# Patient Record
Sex: FEMALE | Race: WHITE
[De-identification: names, ages, dates, MRNs, and addresses within clinical notes are randomized per-mention and may not be internally consistent; named-entity substitution may affect disease eponyms.]

---

## 2017-02-24 ENCOUNTER — HOSPITAL ENCOUNTER (OUTPATIENT)
Dept: HOSPITAL 19 - COL.RAD | Age: 77
End: 2017-02-24
Attending: INTERNAL MEDICINE
Payer: MEDICARE

## 2017-02-24 DIAGNOSIS — I71.2: Primary | ICD-10-CM

## 2017-02-24 DIAGNOSIS — M47.896: ICD-10-CM

## 2017-09-29 ENCOUNTER — HOSPITAL ENCOUNTER (OUTPATIENT)
Dept: HOSPITAL 19 - COL.CAR | Age: 77
Discharge: HOME | End: 2017-09-29
Attending: INTERNAL MEDICINE
Payer: MEDICARE

## 2017-09-29 VITALS — DIASTOLIC BLOOD PRESSURE: 58 MMHG | SYSTOLIC BLOOD PRESSURE: 119 MMHG | TEMPERATURE: 98 F | HEART RATE: 48 BPM

## 2017-09-29 VITALS — HEIGHT: 65.98 IN | WEIGHT: 175.27 LBS | BODY MASS INDEX: 28.17 KG/M2

## 2017-09-29 VITALS — DIASTOLIC BLOOD PRESSURE: 67 MMHG | SYSTOLIC BLOOD PRESSURE: 133 MMHG | HEART RATE: 59 BPM | TEMPERATURE: 98.1 F

## 2017-09-29 DIAGNOSIS — I08.3: ICD-10-CM

## 2017-09-29 DIAGNOSIS — L40.9: ICD-10-CM

## 2017-09-29 DIAGNOSIS — Z79.01: ICD-10-CM

## 2017-09-29 DIAGNOSIS — G72.9: ICD-10-CM

## 2017-09-29 DIAGNOSIS — R00.2: ICD-10-CM

## 2017-09-29 DIAGNOSIS — I27.2: ICD-10-CM

## 2017-09-29 DIAGNOSIS — I71.2: ICD-10-CM

## 2017-09-29 DIAGNOSIS — I10: ICD-10-CM

## 2017-09-29 DIAGNOSIS — M43.06: ICD-10-CM

## 2017-09-29 DIAGNOSIS — I48.0: Primary | ICD-10-CM

## 2017-09-29 DIAGNOSIS — E07.9: ICD-10-CM

## 2017-09-29 LAB
ERYTHROCYTE [DISTWIDTH] IN BLOOD BY AUTOMATED COUNT: 13.5 % (ref 11.5–14.5)
HCT VFR BLD AUTO: 39 % (ref 37–47)
HGB BLD-MCNC: 12.8 G/DL (ref 12.5–16)
MCH RBC QN AUTO: 30 PG (ref 27–31)
MCHC RBC AUTO-ENTMCNC: 33 G/DL (ref 33–37)
MCV RBC AUTO: 90 FL (ref 80–100)
PLATELET # BLD AUTO: 219 K/MM3 (ref 130–400)
PMV BLD AUTO: 10.4 FL (ref 7.4–10.4)
RBC # BLD AUTO: 4.33 M/MM3 (ref 4.1–5.3)
WBC # BLD AUTO: 4.8 K/MM3 (ref 4.8–10.8)

## 2017-12-11 ENCOUNTER — HOSPITAL ENCOUNTER (OUTPATIENT)
Dept: HOSPITAL 19 - MC.RAD | Age: 77
End: 2017-12-11
Attending: FAMILY MEDICINE
Payer: MEDICARE

## 2017-12-11 DIAGNOSIS — Z12.31: Primary | ICD-10-CM

## 2018-11-15 ENCOUNTER — HOSPITAL ENCOUNTER (OUTPATIENT)
Dept: HOSPITAL 6 - RAD | Age: 78
End: 2018-11-15
Payer: MEDICARE

## 2018-11-15 DIAGNOSIS — I71.4: Primary | ICD-10-CM

## 2019-04-05 ENCOUNTER — HOSPITAL ENCOUNTER (OUTPATIENT)
Dept: HOSPITAL 19 - MC.RAD | Age: 79
End: 2019-04-05
Attending: FAMILY MEDICINE
Payer: MEDICARE

## 2019-04-05 DIAGNOSIS — Z98.890: ICD-10-CM

## 2019-04-05 DIAGNOSIS — Z12.31: Primary | ICD-10-CM

## 2019-04-19 ENCOUNTER — HOSPITAL ENCOUNTER (OUTPATIENT)
Dept: HOSPITAL 19 - COL.RAD | Age: 79
End: 2019-04-19
Attending: NURSE PRACTITIONER
Payer: MEDICARE

## 2019-04-19 DIAGNOSIS — I74.5: ICD-10-CM

## 2019-04-19 DIAGNOSIS — I71.4: Primary | ICD-10-CM

## 2019-04-19 DIAGNOSIS — Z90.710: ICD-10-CM

## 2019-04-29 ENCOUNTER — HOSPITAL ENCOUNTER (OUTPATIENT)
Dept: HOSPITAL 19 - COL.RAD | Age: 79
End: 2019-04-29
Attending: NURSE PRACTITIONER
Payer: MEDICARE

## 2019-04-29 DIAGNOSIS — M12.88: ICD-10-CM

## 2019-04-29 DIAGNOSIS — M43.16: ICD-10-CM

## 2019-04-29 DIAGNOSIS — M48.07: Primary | ICD-10-CM

## 2019-04-29 DIAGNOSIS — M51.36: ICD-10-CM

## 2019-04-29 PROCEDURE — A9585 GADOBUTROL INJECTION: HCPCS

## 2019-09-10 ENCOUNTER — HOSPITAL ENCOUNTER (OUTPATIENT)
Dept: HOSPITAL 19 - MHCPAIN | Age: 79
End: 2019-09-10
Attending: ANESTHESIOLOGY
Payer: MEDICARE

## 2019-09-10 DIAGNOSIS — M54.16: ICD-10-CM

## 2019-09-10 DIAGNOSIS — G89.29: Primary | ICD-10-CM

## 2019-09-10 DIAGNOSIS — M96.1: ICD-10-CM

## 2019-09-10 DIAGNOSIS — M53.3: ICD-10-CM

## 2019-09-10 DIAGNOSIS — M47.817: ICD-10-CM

## 2019-09-10 PROCEDURE — G0463 HOSPITAL OUTPT CLINIC VISIT: HCPCS

## 2019-09-16 ENCOUNTER — HOSPITAL ENCOUNTER (OUTPATIENT)
Dept: HOSPITAL 19 - MHCPAIN | Age: 79
End: 2019-09-16
Attending: ANESTHESIOLOGY
Payer: MEDICARE

## 2019-09-16 DIAGNOSIS — M54.16: ICD-10-CM

## 2019-09-16 DIAGNOSIS — M47.817: Primary | ICD-10-CM

## 2019-10-01 ENCOUNTER — HOSPITAL ENCOUNTER (OUTPATIENT)
Dept: HOSPITAL 6 - RAD | Age: 79
End: 2019-10-01
Payer: MEDICARE

## 2019-10-01 DIAGNOSIS — I71.2: Primary | ICD-10-CM

## 2019-10-02 ENCOUNTER — HOSPITAL ENCOUNTER (OUTPATIENT)
Dept: HOSPITAL 19 - MHCPAIN | Age: 79
End: 2019-10-02
Attending: ANESTHESIOLOGY
Payer: MEDICARE

## 2019-10-02 DIAGNOSIS — M53.3: ICD-10-CM

## 2019-10-02 DIAGNOSIS — M54.16: ICD-10-CM

## 2019-10-02 DIAGNOSIS — G89.29: Primary | ICD-10-CM

## 2019-10-02 DIAGNOSIS — M96.1: ICD-10-CM

## 2019-10-02 DIAGNOSIS — M47.817: ICD-10-CM

## 2019-10-02 PROCEDURE — G0463 HOSPITAL OUTPT CLINIC VISIT: HCPCS

## 2019-10-07 ENCOUNTER — HOSPITAL ENCOUNTER (OUTPATIENT)
Dept: HOSPITAL 19 - MHCPAIN | Age: 79
End: 2019-10-07
Attending: ANESTHESIOLOGY
Payer: MEDICARE

## 2019-10-07 DIAGNOSIS — M47.817: Primary | ICD-10-CM

## 2019-10-07 DIAGNOSIS — M54.16: ICD-10-CM

## 2019-10-16 ENCOUNTER — HOSPITAL ENCOUNTER (OUTPATIENT)
Dept: HOSPITAL 19 - MHCPAIN | Age: 79
End: 2019-10-16
Attending: ANESTHESIOLOGY
Payer: MEDICARE

## 2019-10-16 DIAGNOSIS — G89.29: Primary | ICD-10-CM

## 2019-10-16 DIAGNOSIS — M54.16: ICD-10-CM

## 2019-10-16 DIAGNOSIS — M47.817: ICD-10-CM

## 2019-10-16 DIAGNOSIS — M53.3: ICD-10-CM

## 2019-10-16 PROCEDURE — G0463 HOSPITAL OUTPT CLINIC VISIT: HCPCS

## 2019-10-21 ENCOUNTER — HOSPITAL ENCOUNTER (OUTPATIENT)
Dept: HOSPITAL 19 - MHCPAIN | Age: 79
End: 2019-10-21
Attending: ANESTHESIOLOGY
Payer: MEDICARE

## 2019-10-21 DIAGNOSIS — M54.16: ICD-10-CM

## 2019-10-21 DIAGNOSIS — M47.817: Primary | ICD-10-CM

## 2019-11-06 ENCOUNTER — HOSPITAL ENCOUNTER (OUTPATIENT)
Dept: HOSPITAL 19 - MHCPAIN | Age: 79
End: 2019-11-06
Attending: ANESTHESIOLOGY
Payer: MEDICARE

## 2019-11-06 DIAGNOSIS — G89.29: Primary | ICD-10-CM

## 2019-11-06 DIAGNOSIS — M53.3: ICD-10-CM

## 2019-11-06 DIAGNOSIS — M96.1: ICD-10-CM

## 2019-11-06 DIAGNOSIS — M54.16: ICD-10-CM

## 2019-11-06 DIAGNOSIS — M47.817: ICD-10-CM

## 2019-11-06 PROCEDURE — G0463 HOSPITAL OUTPT CLINIC VISIT: HCPCS

## 2019-11-08 ENCOUNTER — HOSPITAL ENCOUNTER (OUTPATIENT)
Dept: HOSPITAL 19 - COL.CAR | Age: 79
Discharge: HOME | End: 2019-11-08
Attending: INTERNAL MEDICINE
Payer: MEDICARE

## 2019-11-08 VITALS — BODY MASS INDEX: 28.7 KG/M2 | WEIGHT: 178.57 LBS | HEIGHT: 65.98 IN

## 2019-11-08 VITALS — HEART RATE: 58 BPM | SYSTOLIC BLOOD PRESSURE: 126 MMHG | DIASTOLIC BLOOD PRESSURE: 65 MMHG

## 2019-11-08 VITALS — DIASTOLIC BLOOD PRESSURE: 53 MMHG | HEART RATE: 64 BPM | SYSTOLIC BLOOD PRESSURE: 108 MMHG

## 2019-11-08 VITALS — DIASTOLIC BLOOD PRESSURE: 85 MMHG | SYSTOLIC BLOOD PRESSURE: 125 MMHG | HEART RATE: 60 BPM

## 2019-11-08 VITALS — DIASTOLIC BLOOD PRESSURE: 62 MMHG | HEART RATE: 64 BPM | SYSTOLIC BLOOD PRESSURE: 126 MMHG

## 2019-11-08 VITALS — DIASTOLIC BLOOD PRESSURE: 65 MMHG | SYSTOLIC BLOOD PRESSURE: 115 MMHG | HEART RATE: 67 BPM

## 2019-11-08 VITALS — HEART RATE: 60 BPM | SYSTOLIC BLOOD PRESSURE: 138 MMHG | DIASTOLIC BLOOD PRESSURE: 72 MMHG

## 2019-11-08 VITALS — HEART RATE: 67 BPM | DIASTOLIC BLOOD PRESSURE: 56 MMHG | SYSTOLIC BLOOD PRESSURE: 111 MMHG

## 2019-11-08 VITALS — DIASTOLIC BLOOD PRESSURE: 66 MMHG | HEART RATE: 59 BPM | SYSTOLIC BLOOD PRESSURE: 124 MMHG

## 2019-11-08 VITALS — HEART RATE: 63 BPM | SYSTOLIC BLOOD PRESSURE: 121 MMHG | DIASTOLIC BLOOD PRESSURE: 68 MMHG

## 2019-11-08 VITALS — HEART RATE: 57 BPM | SYSTOLIC BLOOD PRESSURE: 124 MMHG | DIASTOLIC BLOOD PRESSURE: 66 MMHG

## 2019-11-08 VITALS — HEART RATE: 65 BPM | SYSTOLIC BLOOD PRESSURE: 106 MMHG | DIASTOLIC BLOOD PRESSURE: 52 MMHG

## 2019-11-08 DIAGNOSIS — I10: ICD-10-CM

## 2019-11-08 DIAGNOSIS — I25.10: Primary | ICD-10-CM

## 2019-11-08 DIAGNOSIS — Z95.818: ICD-10-CM

## 2019-11-08 DIAGNOSIS — M79.605: ICD-10-CM

## 2019-11-08 DIAGNOSIS — I48.0: ICD-10-CM

## 2019-11-08 DIAGNOSIS — Z79.01: ICD-10-CM

## 2019-11-08 DIAGNOSIS — M79.604: ICD-10-CM

## 2019-11-08 DIAGNOSIS — Z88.1: ICD-10-CM

## 2019-11-08 DIAGNOSIS — I71.2: ICD-10-CM

## 2019-11-08 LAB
ANION GAP SERPL CALC-SCNC: 5 MMOL/L (ref 7–16)
APTT PPP: 33.1 SECONDS (ref 26–37)
BUN SERPL-MCNC: 21 MG/DL (ref 7–17)
CALCIUM SERPL-MCNC: 9.9 MG/DL (ref 8.4–10.2)
CHLORIDE SERPL-SCNC: 101 MMOL/L (ref 98–107)
CO2 SERPL-SCNC: 32 MMOL/L (ref 22–30)
CREAT SERPL-SCNC: 0.97 UMOL/L (ref 0.52–1.25)
ERYTHROCYTE [DISTWIDTH] IN BLOOD BY AUTOMATED COUNT: 13.2 % (ref 11.5–14.5)
GLUCOSE SERPL-MCNC: 83 MG/DL (ref 74–106)
HCT VFR BLD AUTO: 36.4 % (ref 37–47)
HGB BLD-MCNC: 12 G/DL (ref 12.5–16)
INR BLD: 1 (ref 0.8–3)
MCH RBC QN AUTO: 29 PG (ref 27–31)
MCHC RBC AUTO-ENTMCNC: 33 G/DL (ref 33–37)
MCV RBC AUTO: 89 FL (ref 80–100)
PLATELET # BLD AUTO: 175 K/MM3 (ref 130–400)
PMV BLD AUTO: 11.5 FL (ref 7.4–10.4)
POTASSIUM SERPL-SCNC: 3.3 MMOL/L (ref 3.4–5)
PROTHROMBIN TIME: 11.6 SECONDS (ref 9.7–12.8)
RBC # BLD AUTO: 4.09 M/MM3 (ref 4.1–5.3)
SODIUM SERPL-SCNC: 137 MMOL/L (ref 137–145)

## 2019-11-08 NOTE — NUR
PT back from cath lab, bedside report from [facility].  pt is awake and alert,
groin site looks good without evidence of bleeding or hematoma,  cms isintact
distal.  wctm.  pt denies questions or concerns at this time

## 2019-11-08 NOTE — NUR
SEE MERGE DOCUMENTATION FOR MEDICATION ADMINISTRATION TIMES AND INTRA/POST
PROCEDURE SEDATION ASSESSMENTS. POSITIVE BARBEAU TEST TO RIGHT WRIST.

## 2019-11-08 NOTE — NUR
Pt is tolerating bedrest well with no complaints.  she is currently eating
dinner with some help from her brother who is with her.  groin site remains
soft without bleeding or hematoma.  cms intact distal.

## 2019-11-08 NOTE — NUR
Pt has been up ad leobardo for the past hour,  she has ambulated to bathroom and
back with steady gait, and has lounged about the room with frequent rechecks
of rt groin,  no evidence of bleeding or hematoma has been noted.  cms remains
intact distal.  I reviewed dc instructions regarding site care, activity
restrictions and f/u. pt denied concerns or questions at time of departure.
saline lock to lac was dc'd cath intact, dressing was applied.  pt was
escorted to exit via wheelchair, her brother driving her home.

## 2019-11-21 ENCOUNTER — HOSPITAL ENCOUNTER (OUTPATIENT)
Dept: HOSPITAL 19 - WSPT | Age: 79
LOS: 63 days | Discharge: HOME | End: 2020-01-23
Attending: ANESTHESIOLOGY
Payer: MEDICARE

## 2019-11-21 DIAGNOSIS — M47.27: Primary | ICD-10-CM

## 2019-11-21 DIAGNOSIS — M96.1: ICD-10-CM

## 2019-11-21 DIAGNOSIS — M53.3: ICD-10-CM

## 2020-02-13 ENCOUNTER — HOSPITAL ENCOUNTER (INPATIENT)
Dept: HOSPITAL 19 - JCC | Age: 80
LOS: 3 days | Discharge: HOME | DRG: 345 | End: 2020-02-16
Attending: SURGERY | Admitting: SURGERY
Payer: MEDICARE

## 2020-02-13 ENCOUNTER — HOSPITAL ENCOUNTER (OUTPATIENT)
Dept: HOSPITAL 6 - RAD | Age: 80
End: 2020-02-13
Attending: PHYSICIAN ASSISTANT
Payer: MEDICARE

## 2020-02-13 ENCOUNTER — HOSPITAL ENCOUNTER (EMERGENCY)
Dept: HOSPITAL 6 - ED | Age: 80
Discharge: ANOTHER HOSPITAL | End: 2020-02-13
Payer: MEDICARE

## 2020-02-13 VITALS — DIASTOLIC BLOOD PRESSURE: 55 MMHG | SYSTOLIC BLOOD PRESSURE: 93 MMHG

## 2020-02-13 VITALS — SYSTOLIC BLOOD PRESSURE: 92 MMHG | HEART RATE: 82 BPM | DIASTOLIC BLOOD PRESSURE: 39 MMHG

## 2020-02-13 VITALS — SYSTOLIC BLOOD PRESSURE: 100 MMHG | DIASTOLIC BLOOD PRESSURE: 38 MMHG | TEMPERATURE: 98.1 F | HEART RATE: 81 BPM

## 2020-02-13 VITALS — HEART RATE: 79 BPM | SYSTOLIC BLOOD PRESSURE: 90 MMHG | DIASTOLIC BLOOD PRESSURE: 39 MMHG

## 2020-02-13 VITALS — WEIGHT: 170.42 LBS | HEIGHT: 65.98 IN | BODY MASS INDEX: 27.39 KG/M2

## 2020-02-13 VITALS — BODY MASS INDEX: 28.13 KG/M2 | WEIGHT: 175.05 LBS | HEIGHT: 65.98 IN

## 2020-02-13 VITALS — SYSTOLIC BLOOD PRESSURE: 87 MMHG | HEART RATE: 78 BPM | DIASTOLIC BLOOD PRESSURE: 44 MMHG

## 2020-02-13 VITALS — SYSTOLIC BLOOD PRESSURE: 93 MMHG | DIASTOLIC BLOOD PRESSURE: 46 MMHG | HEART RATE: 69 BPM | TEMPERATURE: 97.6 F

## 2020-02-13 VITALS — DIASTOLIC BLOOD PRESSURE: 46 MMHG | SYSTOLIC BLOOD PRESSURE: 96 MMHG | HEART RATE: 81 BPM

## 2020-02-13 VITALS — SYSTOLIC BLOOD PRESSURE: 108 MMHG | DIASTOLIC BLOOD PRESSURE: 51 MMHG | TEMPERATURE: 98.6 F | HEART RATE: 74 BPM

## 2020-02-13 VITALS — SYSTOLIC BLOOD PRESSURE: 100 MMHG | DIASTOLIC BLOOD PRESSURE: 43 MMHG | HEART RATE: 81 BPM

## 2020-02-13 VITALS — DIASTOLIC BLOOD PRESSURE: 38 MMHG | SYSTOLIC BLOOD PRESSURE: 93 MMHG | HEART RATE: 78 BPM

## 2020-02-13 VITALS — HEART RATE: 74 BPM | SYSTOLIC BLOOD PRESSURE: 112 MMHG | DIASTOLIC BLOOD PRESSURE: 52 MMHG

## 2020-02-13 DIAGNOSIS — I10: ICD-10-CM
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
## 2020-02-13 DIAGNOSIS — Z86.79: ICD-10-CM

## 2020-02-13 DIAGNOSIS — G47.33: ICD-10-CM

## 2020-02-13 DIAGNOSIS — K63.1: Primary | ICD-10-CM

## 2020-02-13 DIAGNOSIS — I48.91: ICD-10-CM

## 2020-02-13 DIAGNOSIS — G89.29: ICD-10-CM

## 2020-02-13 DIAGNOSIS — Z90.710: ICD-10-CM

## 2020-02-13 DIAGNOSIS — K63.89: Primary | ICD-10-CM

## 2020-02-13 DIAGNOSIS — Z88.1: ICD-10-CM

## 2020-02-13 DIAGNOSIS — E03.9: ICD-10-CM

## 2020-02-13 DIAGNOSIS — Z90.49: ICD-10-CM

## 2020-02-13 DIAGNOSIS — K56.7: ICD-10-CM

## 2020-02-13 DIAGNOSIS — K21.9: ICD-10-CM

## 2020-02-13 DIAGNOSIS — M19.90: ICD-10-CM

## 2020-02-13 DIAGNOSIS — N18.9: ICD-10-CM

## 2020-02-13 DIAGNOSIS — I11.0: ICD-10-CM

## 2020-02-13 DIAGNOSIS — T18.3XXA: ICD-10-CM

## 2020-02-13 DIAGNOSIS — Z88.8: ICD-10-CM

## 2020-02-13 DIAGNOSIS — M54.5: ICD-10-CM

## 2020-02-13 LAB
ALBUMIN SERPL-MCNC: 3.9 G/DL (ref 3.4–4.8)
ALT SERPL-CCNC: 24 U/L (ref 0–55)
APPEARANCE UR: (no result)
AST SERPL-CCNC: 25 U/L (ref 5–34)
BASOPHILS # BLD: 0 10*3/UL (ref 0.02–0.1)
BILIRUB SERPL-MCNC: 1 MG/DL (ref 0.2–1.2)
BILIRUB UR QL STRIP.AUTO: NEGATIVE
CALCIUM SERPL-MCNC: 9.8 MG/DL (ref 8.3–10.5)
CO2 SERPL-SCNC: 29 MMOL/L (ref 23–31)
COLOR UR AUTO: YELLOW
EOSINOPHIL # BLD: 0 10*3/UL (ref 0.04–0.4)
EOSINOPHIL NFR BLD: 0.1 % (ref 1–5)
ERYTHROCYTE [DISTWIDTH] IN BLOOD BY AUTOMATED COUNT: 15.2 % (ref 11.5–14.5)
GLUCOSE SERPL-MCNC: 118 MG/DL (ref 65–105)
GLUCOSE UR QL STRIP.AUTO: NEGATIVE MG/DL
HCT VFR BLD AUTO: 38.6 % (ref 37–47)
HGB BLD-MCNC: 12 G/DL (ref 12.5–16)
KETONES UR QL STRIP.AUTO: NEGATIVE
LEUKOCYTE ESTERASE UR QL STRIP: NEGATIVE
LYMPHOCYTES # BLD: 2.3 10*3/UL (ref 1.5–4)
MCH RBC QN AUTO: 27 PG (ref 27–31)
MCHC RBC AUTO-ENTMCNC: 31 G/DL (ref 33–37)
MCV RBC AUTO: 86 FL (ref 78–100)
MONOCYTES # BLD: 2 10*3/UL (ref 0.2–0.8)
NEUTROPHILS # BLD: 15 10*3/UL (ref 1.4–6.5)
NITRITE UR QL STRIP: NEGATIVE
PH UR STRIP.AUTO: 7 [PH] (ref 5–8)
PLATELET # BLD AUTO: 310 K/MM3 (ref 130–400)
PMV BLD AUTO: 10 FL (ref 7.4–10.4)
POTASSIUM SERPL-SCNC: 3.8 MMOL/L (ref 3.5–5.1)
PROT ?TM UR-MCNC: NEGATIVE MG/DL
PROT SERPL-MCNC: 7.9 G/DL (ref 6.2–8.1)
RBC # BLD AUTO: 4.5 M/MM3 (ref 4.1–5.3)
RBC UR QL AUTO: NEGATIVE
SODIUM SERPL-SCNC: 132 MMOL/L (ref 136–145)
SP GR UR STRIP.AUTO: 1.01 (ref 1–1.03)
UROBILINOGEN UR-MCNC: NORMAL MG/DL
WBC # BLD AUTO: 19.4 K/MM3 (ref 4.8–10.8)
WBC #/AREA URNS HPF: (no result) /HPF (ref 0–3)

## 2020-02-13 PROCEDURE — A4314 CATH W/DRAINAGE 2-WAY LATEX: HCPCS

## 2020-02-13 PROCEDURE — 0DC84ZZ EXTIRPATION OF MATTER FROM SMALL INTESTINE, PERCUTANEOUS ENDOSCOPIC APPROACH: ICD-10-PCS | Performed by: SURGERY

## 2020-02-13 PROCEDURE — A9284 NON-ELECTRONIC SPIROMETER: HCPCS

## 2020-02-13 NOTE — NUR
Patient is going to surgery. Dr Nagel spoke with patient. Patient signed
consent. Pre-ops given. Home medications updated in the computer. Patient was
able to void before going down for surgery. Anesthesia spoke with patient as
well. No other changes at this time. Patients  and brother followed
patient down.

## 2020-02-13 NOTE — NUR
POST OP VITALS COMPLETE. PATIENT RESTING WELL, HAS OXYGEN ON AT 3L/NC DUE TO
LOW SAO2 READINGS. IVF INFUSING TO RIGHT AC SITE WITHOUT REDNESS OR SWELLING.
HAS TAKEN MINIMAL PO FLUIDS. BANDAIDS TO ABD X3 D/I. HAS BEEN UP TO VOID X1.
DENIES PAIN.

## 2020-02-13 NOTE — NUR
RETURNS FROM SURGERY PER BED. IS AWAKE AND ALERT. IVF TO RIGHT AC WITHOUT
REDNESS OR SWELLING. FAMILY AT BEDSIDE.

## 2020-02-14 VITALS — SYSTOLIC BLOOD PRESSURE: 97 MMHG | DIASTOLIC BLOOD PRESSURE: 48 MMHG | HEART RATE: 71 BPM | TEMPERATURE: 97.9 F

## 2020-02-14 VITALS — SYSTOLIC BLOOD PRESSURE: 104 MMHG | DIASTOLIC BLOOD PRESSURE: 47 MMHG | TEMPERATURE: 97.2 F | HEART RATE: 73 BPM

## 2020-02-14 VITALS — SYSTOLIC BLOOD PRESSURE: 97 MMHG | DIASTOLIC BLOOD PRESSURE: 50 MMHG | HEART RATE: 72 BPM | TEMPERATURE: 97.6 F

## 2020-02-14 VITALS — DIASTOLIC BLOOD PRESSURE: 47 MMHG | TEMPERATURE: 97.8 F | SYSTOLIC BLOOD PRESSURE: 95 MMHG | HEART RATE: 72 BPM

## 2020-02-14 VITALS — TEMPERATURE: 97.2 F | DIASTOLIC BLOOD PRESSURE: 51 MMHG | HEART RATE: 76 BPM | SYSTOLIC BLOOD PRESSURE: 95 MMHG

## 2020-02-14 VITALS — HEART RATE: 68 BPM | SYSTOLIC BLOOD PRESSURE: 99 MMHG | TEMPERATURE: 97.7 F | DIASTOLIC BLOOD PRESSURE: 52 MMHG

## 2020-02-14 LAB
ANION GAP SERPL CALC-SCNC: 9 MMOL/L (ref 7–16)
BUN SERPL-MCNC: 18 MG/DL (ref 7–17)
CALCIUM SERPL-MCNC: 9.6 MG/DL (ref 8.4–10.2)
CHLORIDE SERPL-SCNC: 98 MMOL/L (ref 98–107)
CO2 SERPL-SCNC: 29 MMOL/L (ref 22–30)
CREAT SERPL-SCNC: 0.93 UMOL/L (ref 0.52–1.25)
ERYTHROCYTE [DISTWIDTH] IN BLOOD BY AUTOMATED COUNT: 15.1 % (ref 11.5–14.5)
GLUCOSE SERPL-MCNC: 160 MG/DL (ref 74–106)
HCT VFR BLD AUTO: 33 % (ref 37–47)
HGB BLD-MCNC: 10.3 G/DL (ref 12.5–16)
LYMPHOCYTES NFR BLD MANUAL: 4 % (ref 20–51)
MCH RBC QN AUTO: 27 PG (ref 27–31)
MCHC RBC AUTO-ENTMCNC: 31 G/DL (ref 33–37)
MCV RBC AUTO: 86 FL (ref 80–100)
NEUTS BAND NFR BLD: 9 % (ref 0–10)
NEUTS SEG NFR BLD MANUAL: 87 % (ref 42–75.2)
PLATELET # BLD AUTO: 223 K/MM3 (ref 130–400)
PLATELET BLD QL SMEAR: NORMAL
PMV BLD AUTO: 10.3 FL (ref 7.4–10.4)
POTASSIUM SERPL-SCNC: 3.7 MMOL/L (ref 3.4–5)
RBC # BLD AUTO: 3.82 M/MM3 (ref 4.1–5.3)
SODIUM SERPL-SCNC: 136 MMOL/L (ref 137–145)

## 2020-02-14 NOTE — NUR
PATIENT AWAKENED FOR PO MEDS. DENIES NEEDS OR PAIN AT THIS TIME. IVF INFUSING
TO RIGHT AC WITHOUT PROBLEM.

## 2020-02-14 NOTE — NUR
PATIENT REPORTED PASSING A SMALL AMOUNT OF FLATUS FOR THE FIRST TIME. NURSING
CONTINUES TO ENCOURAGE ACTIVITY. WILL MONITOR.

## 2020-02-14 NOTE — NUR
SW met with the patient to discuss discharge plan. The patient lives outside
of [place] with her , Rafael ([phone]). She reports
independence with ADLs and does not have any DME. The patient's PCP is Dr. Kali Cruz and she receives her medications at [facility]. She reports
no difficulties obtaining her meds. The patient does not have advanced
directives in EMR, but she states that she does have them completed and that
her  is her DPOA-HC. The patient plans to return home with her 
upon discharge. No additional needs at this time.

## 2020-02-14 NOTE — NUR
Patient was sleeping in bed when student entered to do assesssment. She got
up and walked on unit 2 laps x3 times. Stated she was starting to pass more
humphrey after each time she walked. Shower was given after lunch. we used a warm
blanekt to warm abdomen for increase of parastalsis within intensines.

## 2020-02-14 NOTE — NUR
Patient doing well tonight. alert and oriented x4. denies pain at this time.
abd laps x4 CDI with bandaids. BP soft, but this is normal for patient.
patient teaching done regarding need for restarting fluids if bp gets any
lower. diet advanced to full liquids for breakfast in am. INT to R FA patent
and flushes without issue. took scheduled medications without issue. no
further need at this time. will continue to monitor.

## 2020-02-14 NOTE — NUR
Patient was resting quietly when student entered. Iv site intact. Very
cheerful and delighted to conversate. No issues at this time.

## 2020-02-15 VITALS — TEMPERATURE: 98.1 F | SYSTOLIC BLOOD PRESSURE: 95 MMHG | DIASTOLIC BLOOD PRESSURE: 52 MMHG | HEART RATE: 67 BPM

## 2020-02-15 VITALS — SYSTOLIC BLOOD PRESSURE: 100 MMHG | TEMPERATURE: 97.5 F | HEART RATE: 64 BPM | DIASTOLIC BLOOD PRESSURE: 50 MMHG

## 2020-02-15 VITALS — TEMPERATURE: 98.2 F | SYSTOLIC BLOOD PRESSURE: 121 MMHG | DIASTOLIC BLOOD PRESSURE: 69 MMHG | HEART RATE: 73 BPM

## 2020-02-15 VITALS — HEART RATE: 70 BPM | SYSTOLIC BLOOD PRESSURE: 116 MMHG | TEMPERATURE: 98.4 F | DIASTOLIC BLOOD PRESSURE: 55 MMHG

## 2020-02-15 VITALS — HEART RATE: 70 BPM | DIASTOLIC BLOOD PRESSURE: 55 MMHG | TEMPERATURE: 97.9 F | SYSTOLIC BLOOD PRESSURE: 103 MMHG

## 2020-02-15 VITALS — TEMPERATURE: 98 F | SYSTOLIC BLOOD PRESSURE: 97 MMHG | DIASTOLIC BLOOD PRESSURE: 52 MMHG | HEART RATE: 62 BPM

## 2020-02-15 NOTE — NUR
Report received. Assumed care for night shift. Assessment complete. A&Ox3. VS
stable. Has been up and ambulating in the halls. Abdomen is very
distended-hypoactive bowel sounds. States she can feel the gas moving but has
not passed any yet. Denies nausea/shortness of breath/pain. 3 Lap sites-edges
well approximated-no dressing. Plan of care discussed for this shift. Denies
questions/concerns. Encouraged to call for needs. Call light in reach. Will
monitor.

## 2020-02-15 NOTE — NUR
Took full liquids slowly. Minimal bowel sounds per auscultation. Encouraged to
ambulate as tolerated. Afebrile.

## 2020-02-16 VITALS — HEART RATE: 72 BPM | TEMPERATURE: 97.4 F | DIASTOLIC BLOOD PRESSURE: 51 MMHG | SYSTOLIC BLOOD PRESSURE: 104 MMHG

## 2020-02-16 VITALS — TEMPERATURE: 98.5 F | HEART RATE: 80 BPM | DIASTOLIC BLOOD PRESSURE: 54 MMHG | SYSTOLIC BLOOD PRESSURE: 112 MMHG

## 2020-02-16 VITALS — HEART RATE: 79 BPM | DIASTOLIC BLOOD PRESSURE: 50 MMHG | SYSTOLIC BLOOD PRESSURE: 101 MMHG | TEMPERATURE: 98.6 F

## 2020-02-16 LAB
ANION GAP SERPL CALC-SCNC: 7 MMOL/L (ref 7–16)
BUN SERPL-MCNC: 19 MG/DL (ref 7–17)
CALCIUM SERPL-MCNC: 9.5 MG/DL (ref 8.4–10.2)
CHLORIDE SERPL-SCNC: 102 MMOL/L (ref 98–107)
CO2 SERPL-SCNC: 29 MMOL/L (ref 22–30)
CREAT SERPL-SCNC: 0.9 UMOL/L (ref 0.52–1.25)
ERYTHROCYTE [DISTWIDTH] IN BLOOD BY AUTOMATED COUNT: 15.3 % (ref 11.5–14.5)
GLUCOSE SERPL-MCNC: 89 MG/DL (ref 74–106)
HCT VFR BLD AUTO: 33.5 % (ref 37–47)
HGB BLD-MCNC: 10.2 G/DL (ref 12.5–16)
MCH RBC QN AUTO: 26 PG (ref 27–31)
MCHC RBC AUTO-ENTMCNC: 30 G/DL (ref 33–37)
MCV RBC AUTO: 87 FL (ref 80–100)
PLATELET # BLD AUTO: 255 K/MM3 (ref 130–400)
PMV BLD AUTO: 9.9 FL (ref 7.4–10.4)
POTASSIUM SERPL-SCNC: 3.7 MMOL/L (ref 3.4–5)
RBC # BLD AUTO: 3.86 M/MM3 (ref 4.1–5.3)
SODIUM SERPL-SCNC: 137 MMOL/L (ref 137–145)

## 2020-02-16 NOTE — NUR
Has rested off and on this shift. Pain has been adequately controlled with
scheduled tylenol and K pad. Had one episode of "slight nausea" but denied
need for intervention. Ambulated in hallways several times. +flatus/+BM-small.
Denies needs. Will monitor.

## 2020-02-16 NOTE — NUR
Scheduled tylenol given per dr order. Rating pain 2/10 to abdomen-intermittent
cramp. Had one more loose stool. States she feels less distended and has
passed gas only a few times. Denies nausea. Call light in reach. Will monitor.

## 2020-02-16 NOTE — NUR
Up to bathroom at this time-x large loose BM. States she also had a lot of
flatus. Denies needs. Will monitor.

## 2020-02-16 NOTE — NUR
Denied pain today. Abdomen less bloated with active bowel sounds. Had some
loose stools. Abdominal incision sites CDI. Bruising present. Dr. Carrera saw
patient. Prescription given. Dismissed to home per w/c with family.

## 2020-02-16 NOTE — NUR
Rating pain 4/10-with movement-to abdomen described as ache. Scheduled tylenol
given per  order. States she has started to pass some gas. Denies needs.
Call light in reach. Will monitor

## 2020-03-16 ENCOUNTER — HOSPITAL ENCOUNTER (OUTPATIENT)
Dept: HOSPITAL 6 - CARDREHAB | Age: 80
LOS: 34 days | Discharge: HOME | End: 2020-04-19
Payer: MEDICARE

## 2020-03-16 DIAGNOSIS — I35.8: ICD-10-CM

## 2020-03-16 DIAGNOSIS — Z48.812: Primary | ICD-10-CM

## 2020-07-07 ENCOUNTER — HOSPITAL ENCOUNTER (OUTPATIENT)
Dept: HOSPITAL 19 - MC.RAD | Age: 80
End: 2020-07-07
Attending: FAMILY MEDICINE
Payer: MEDICARE

## 2020-07-07 DIAGNOSIS — Z12.31: Primary | ICD-10-CM

## 2021-07-28 ENCOUNTER — HOSPITAL ENCOUNTER (OUTPATIENT)
Dept: HOSPITAL 19 - MC.RAD | Age: 81
End: 2021-07-28
Attending: INTERNAL MEDICINE
Payer: MEDICARE

## 2021-07-28 DIAGNOSIS — Z98.890: ICD-10-CM

## 2021-07-28 DIAGNOSIS — Z12.31: Primary | ICD-10-CM

## 2021-12-23 ENCOUNTER — HOSPITAL ENCOUNTER (OUTPATIENT)
Dept: HOSPITAL 19 - WSPT | Age: 81
LOS: 8 days | Discharge: HOME | End: 2021-12-31
Attending: INTERNAL MEDICINE
Payer: MEDICARE

## 2021-12-23 DIAGNOSIS — R26.81: ICD-10-CM

## 2021-12-23 DIAGNOSIS — M48.062: Primary | ICD-10-CM
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
## 2021-12-23 DIAGNOSIS — R29.898: ICD-10-CM

## 2022-02-08 ENCOUNTER — HOSPITAL ENCOUNTER (OUTPATIENT)
Dept: HOSPITAL 19 - MHCPAIN | Age: 82
End: 2022-02-08
Attending: ANESTHESIOLOGY
Payer: MEDICARE

## 2022-02-08 DIAGNOSIS — M54.16: ICD-10-CM

## 2022-02-08 DIAGNOSIS — M96.1: ICD-10-CM

## 2022-02-08 DIAGNOSIS — M53.3: Primary | ICD-10-CM

## 2022-02-08 PROCEDURE — G0463 HOSPITAL OUTPT CLINIC VISIT: HCPCS

## 2022-06-20 ENCOUNTER — HOSPITAL ENCOUNTER (OUTPATIENT)
Dept: HOSPITAL 19 - MHCPAIN | Age: 82
End: 2022-06-20
Attending: ANESTHESIOLOGY
Payer: MEDICARE

## 2022-06-20 DIAGNOSIS — M53.3: ICD-10-CM

## 2022-06-20 DIAGNOSIS — M96.1: ICD-10-CM

## 2022-06-20 DIAGNOSIS — M54.16: ICD-10-CM

## 2022-06-20 DIAGNOSIS — M47.817: Primary | ICD-10-CM

## 2022-06-20 PROCEDURE — G0463 HOSPITAL OUTPT CLINIC VISIT: HCPCS

## 2022-07-13 ENCOUNTER — HOSPITAL ENCOUNTER (OUTPATIENT)
Dept: HOSPITAL 19 - MHCPAIN | Age: 82
End: 2022-07-13
Attending: ANESTHESIOLOGY
Payer: MEDICARE

## 2022-07-13 DIAGNOSIS — M54.16: ICD-10-CM

## 2022-07-13 DIAGNOSIS — M47.896: Primary | ICD-10-CM

## 2022-07-13 DIAGNOSIS — M53.3: ICD-10-CM

## 2022-07-13 DIAGNOSIS — M96.1: ICD-10-CM

## 2022-07-13 PROCEDURE — G0463 HOSPITAL OUTPT CLINIC VISIT: HCPCS

## 2022-08-12 ENCOUNTER — HOSPITAL ENCOUNTER (OUTPATIENT)
Dept: HOSPITAL 6 - LAB | Age: 82
End: 2022-08-12
Payer: MEDICARE

## 2022-08-12 DIAGNOSIS — L08.9: Primary | ICD-10-CM

## 2022-08-12 LAB
ALBUMIN SERPL-MCNC: 4 G/DL (ref 3.4–4.8)
ALT SERPL-CCNC: 26 U/L (ref 0–55)
AST SERPL-CCNC: 21 U/L (ref 5–34)
BASOPHILS # BLD: 0.02 K/MM3 (ref 0.02–0.1)
BILIRUB SERPL-MCNC: 0.6 MG/DL (ref 0.2–1.2)
CALCIUM SERPL-MCNC: 10 MG/DL (ref 8.3–10.5)
CO2 SERPL-SCNC: 25 MMOL/L (ref 23–31)
EOSINOPHIL # BLD: 0.04 K/MM3 (ref 0.04–0.4)
EOSINOPHIL NFR BLD: 0.4 % (ref 1–5)
ERYTHROCYTE [DISTWIDTH] IN BLOOD BY AUTOMATED COUNT: 14.4 % (ref 11.5–14.5)
ERYTHROCYTE [SEDIMENTATION RATE] IN BLOOD: 50 MM/HR (ref 0–30)
GLUCOSE SERPL-MCNC: 172 MG/DL (ref 65–105)
HCT VFR BLD AUTO: 42.1 % (ref 37–47)
HGB BLD-MCNC: 13.3 G/DL (ref 12.5–16)
LYMPHOCYTES # BLD: 1.54 K/MM3 (ref 1.5–4)
MCH RBC QN AUTO: 29 PG (ref 27–31)
MCHC RBC AUTO-ENTMCNC: 32 G/DL (ref 33–37)
MCV RBC AUTO: 93 FL (ref 78–100)
MONOCYTES # BLD: 0.8 K/MM3 (ref 0.2–0.8)
NEUTROPHILS # BLD: 6.6 K/MM3 (ref 1.4–6.5)
PLATELET # BLD AUTO: 204 K/MM3 (ref 130–400)
PMV BLD AUTO: 9.9 FL (ref 7.4–10.4)
POTASSIUM SERPL-SCNC: 3.6 MMOL/L (ref 3.5–5.1)
PROT SERPL-MCNC: 7.5 G/DL (ref 6.2–8.1)
RBC # BLD AUTO: 4.55 M/MM3 (ref 4.1–5.3)
SODIUM SERPL-SCNC: 137 MMOL/L (ref 136–145)
WBC # BLD AUTO: 9 K/MM3 (ref 4.8–10.8)

## 2023-04-25 ENCOUNTER — HOSPITAL ENCOUNTER (OUTPATIENT)
Dept: HOSPITAL 19 - MHCPAIN | Age: 83
End: 2023-04-25
Attending: ANESTHESIOLOGY
Payer: MEDICARE

## 2023-04-25 DIAGNOSIS — M48.062: ICD-10-CM

## 2023-04-25 DIAGNOSIS — M96.1: ICD-10-CM

## 2023-04-25 DIAGNOSIS — M54.17: Primary | ICD-10-CM

## 2023-04-25 PROCEDURE — G0463 HOSPITAL OUTPT CLINIC VISIT: HCPCS

## 2023-08-14 ENCOUNTER — HOSPITAL ENCOUNTER (OUTPATIENT)
Dept: HOSPITAL 6 - PT | Age: 83
LOS: 17 days | Discharge: HOME | End: 2023-08-31
Payer: MEDICARE

## 2023-08-14 DIAGNOSIS — R26.81: ICD-10-CM

## 2023-08-14 DIAGNOSIS — R29.6: Primary | ICD-10-CM

## 2024-07-09 ENCOUNTER — HOSPITAL ENCOUNTER (OUTPATIENT)
Dept: HOSPITAL 6 - LAB | Age: 84
End: 2024-07-09
Payer: MEDICARE

## 2024-07-09 DIAGNOSIS — I71.9: Primary | ICD-10-CM

## 2024-07-10 ENCOUNTER — HOSPITAL ENCOUNTER (OUTPATIENT)
Dept: HOSPITAL 6 - RAD | Age: 84
End: 2024-07-10
Payer: MEDICARE

## 2024-07-10 DIAGNOSIS — I71.21: Primary | ICD-10-CM

## 2024-07-10 DIAGNOSIS — R91.8: ICD-10-CM
